# Patient Record
Sex: FEMALE | Race: WHITE | Employment: OTHER | URBAN - METROPOLITAN AREA
[De-identification: names, ages, dates, MRNs, and addresses within clinical notes are randomized per-mention and may not be internally consistent; named-entity substitution may affect disease eponyms.]

---

## 2017-12-06 ENCOUNTER — HOSPITAL ENCOUNTER (OUTPATIENT)
Dept: GENERAL RADIOLOGY | Age: 67
Discharge: HOME | End: 2017-12-06
Payer: MEDICARE

## 2017-12-06 DIAGNOSIS — M25.552 PAIN OF LEFT HIP JOINT: ICD-10-CM

## 2017-12-06 DIAGNOSIS — M25.551 PAIN OF RIGHT HIP JOINT: ICD-10-CM

## 2017-12-06 PROCEDURE — 73502 X-RAY EXAM HIP UNI 2-3 VIEWS: CPT

## 2017-12-12 ENCOUNTER — HOSPITAL ENCOUNTER (OUTPATIENT)
Dept: PHYSICAL THERAPY | Age: 67
Setting detail: THERAPIES SERIES
Discharge: HOME | End: 2017-12-12
Payer: MEDICARE

## 2017-12-12 PROCEDURE — 97530 THERAPEUTIC ACTIVITIES: CPT

## 2017-12-12 PROCEDURE — G8978 MOBILITY CURRENT STATUS: HCPCS

## 2017-12-12 PROCEDURE — 97110 THERAPEUTIC EXERCISES: CPT

## 2017-12-12 PROCEDURE — G8979 MOBILITY GOAL STATUS: HCPCS

## 2017-12-12 PROCEDURE — 97162 PT EVAL MOD COMPLEX 30 MIN: CPT

## 2017-12-12 ASSESSMENT — PAIN DESCRIPTION - PAIN TYPE: TYPE: CHRONIC PAIN

## 2017-12-12 ASSESSMENT — PAIN SCALES - GENERAL: PAINLEVEL_OUTOF10: 1

## 2017-12-15 ENCOUNTER — HOSPITAL ENCOUNTER (OUTPATIENT)
Dept: PHYSICAL THERAPY | Age: 67
Setting detail: THERAPIES SERIES
Discharge: HOME | End: 2017-12-15
Payer: MEDICARE

## 2017-12-15 PROCEDURE — 97140 MANUAL THERAPY 1/> REGIONS: CPT

## 2017-12-15 PROCEDURE — 97110 THERAPEUTIC EXERCISES: CPT

## 2017-12-15 ASSESSMENT — PAIN SCALES - GENERAL: PAINLEVEL_OUTOF10: 1

## 2017-12-15 ASSESSMENT — PAIN DESCRIPTION - PAIN TYPE: TYPE: CHRONIC PAIN

## 2017-12-15 NOTE — PROGRESS NOTES
Physical Therapy  Daily Treatment Note  Date: 12/15/2017  Patient Name: Ondina Mcclain  MRN: [id number]     :   1950    Subjective:   General  Chart Reviewed: Yes  Additional Pertinent Hx: herniated lumbar disc L5-S1  microdiscectomy, fibromyalgia  PT Visit Information  PT Insurance Information: Medicare  Total # of Visits Approved: 8 (2x per week for 4 weeks= 8 weeks)  Total # of Visits to Date: 2  No Show: 0  Canceled Appointment: 0  Subjective  Subjective: Pt states her tightness in her hip is improving. General Comment  Comments:  MRI scheduled for hip   Pain Screening  Patient Currently in Pain: Yes  Pain Assessment  Pain Assessment: 0-10  Pain Level: 1  Pain Type: Chronic pain  Vital Signs  Patient Currently in Pain: Yes       Treatment Activities:   Manual therapy  Soft Tissue Mobalization: L hip distraction with gentle oscillations to decrease pain. Ambulation  Ambulation?: Yes  Ambulation 1  Surface: level tile;carpet  Device: No Device  Assistance: Independent  Quality of Gait: pt displays inc step length after stretches but had pinching sensation in L posterior hip after 80'  Distance: 120'                    Exercises  Exercise 1: supine hip flexor stretch with L LE hanging over edge of bed H20-30 sec x 3  Exercise 2: supine hip abd x 3 H 30 sec (L only)  Exercise 3: SKTC H 30 sec x 3 reps  (R and L)  Exercise 4: hooklying march w/ abd brace; H3 x 10 bilaterally  Exercise 5: butterfly stretch; H30 x 3   Exercise 6: bridge w/ adduction; no hold x 10   Exercise 7: hip abd in hooklying; BTB H5 x 10   Exercise 8: R sidelying clamshell; H5 x 10 GTB   Exercise 9: seated L piriformis stretch; H 30 x 2  Exercise 20: recumbent bike x 5 min        Manual therapy  Soft Tissue Mobalization: L hip distraction with gentle oscillations to decrease pain.                            Assessment:   Conditions Requiring Skilled Therapeutic Intervention  Body structures, Functions, Activity

## 2017-12-17 ENCOUNTER — HOSPITAL ENCOUNTER (OUTPATIENT)
Dept: MRI IMAGING | Age: 67
Discharge: HOME | End: 2017-12-17
Payer: MEDICARE

## 2017-12-17 DIAGNOSIS — M16.9 LOCALIZED OSTEOARTHRITIS OF HIP: ICD-10-CM

## 2017-12-17 PROCEDURE — 72148 MRI LUMBAR SPINE W/O DYE: CPT

## 2017-12-18 ENCOUNTER — HOSPITAL ENCOUNTER (OUTPATIENT)
Dept: PHYSICAL THERAPY | Age: 67
Setting detail: THERAPIES SERIES
Discharge: HOME | End: 2017-12-18
Payer: MEDICARE

## 2017-12-18 PROCEDURE — 97140 MANUAL THERAPY 1/> REGIONS: CPT

## 2017-12-18 PROCEDURE — 97110 THERAPEUTIC EXERCISES: CPT

## 2017-12-18 ASSESSMENT — PAIN SCALES - GENERAL: PAINLEVEL_OUTOF10: 1

## 2017-12-18 ASSESSMENT — PAIN DESCRIPTION - PAIN TYPE: TYPE: CHRONIC PAIN

## 2017-12-22 ENCOUNTER — HOSPITAL ENCOUNTER (OUTPATIENT)
Dept: PHYSICAL THERAPY | Age: 67
Setting detail: THERAPIES SERIES
Discharge: HOME | End: 2017-12-22
Payer: MEDICARE

## 2017-12-22 PROCEDURE — 97110 THERAPEUTIC EXERCISES: CPT

## 2017-12-22 PROCEDURE — 97140 MANUAL THERAPY 1/> REGIONS: CPT

## 2017-12-22 ASSESSMENT — PAIN SCALES - GENERAL: PAINLEVEL_OUTOF10: 4

## 2017-12-22 ASSESSMENT — PAIN DESCRIPTION - ORIENTATION: ORIENTATION: LEFT

## 2017-12-22 ASSESSMENT — PAIN DESCRIPTION - LOCATION: LOCATION: HIP

## 2017-12-22 ASSESSMENT — PAIN DESCRIPTION - PAIN TYPE: TYPE: CHRONIC PAIN

## 2017-12-22 NOTE — PROGRESS NOTES
responded well to session w/her pain at a 1/10 after therapy. Pt continues to c/o \"hot spot\" in her back, pt declined a CP after session. Will ice at home. Continue and increase to tolerance.   Treatment Diagnosis: hip OA, difficulty walking  REQUIRES PT FOLLOW UP: Yes  Activity Tolerance  Activity Tolerance: Patient Tolerated treatment well      G-Code:     OutComes Score                                           Goals:  Short term goals  Time Frame for Short term goals: 2 wks  Short term goal 1: I with HEP  Short term goal 2: Decrease hip and back pain any amount during walking  Long term goals  Time Frame for Long term goals : 4 wks  Long term goal 1: Improve AROM L hip WFL without pain  Long term goal 2: Improve strength LE 4+/5 without pain during MMT to improve walking, stairclimbing  Long term goal 3: Improve LEFS less than 20%  Long term goal 4: Pt perform ADLs including community ambulation,  house work and work in yard with 2-3/10 pain or less greater than 75% of the day  Long term goal 5: Ambulate 300 feet or greater without device demonstrating near equal step length, improved upright posture with 3/10 pain or less  Patient Goals   Patient goals : Decrease pain and be able to go back to yard work, cutting wood and walking more with less pain    Plan:    Plan  Plan Comment: Cont per POC  Frequency and duration of tx  Days: 2  Weeks: 4     Therapy Time   Individual      Time In 1300         Time Out 1400         Minutes 60  therex 40  manual [address] [facility] [place]  License and [place] [address] Number: [id number]

## 2017-12-26 ENCOUNTER — HOSPITAL ENCOUNTER (OUTPATIENT)
Dept: PHYSICAL THERAPY | Age: 67
Setting detail: THERAPIES SERIES
Discharge: HOME | End: 2017-12-26
Payer: MEDICARE

## 2017-12-26 PROCEDURE — 97110 THERAPEUTIC EXERCISES: CPT

## 2017-12-26 ASSESSMENT — PAIN DESCRIPTION - PAIN TYPE: TYPE: CHRONIC PAIN

## 2017-12-26 ASSESSMENT — PAIN SCALES - GENERAL: PAINLEVEL_OUTOF10: 1

## 2017-12-26 ASSESSMENT — PAIN DESCRIPTION - ORIENTATION: ORIENTATION: LEFT

## 2017-12-26 ASSESSMENT — PAIN DESCRIPTION - LOCATION: LOCATION: HIP

## 2017-12-26 NOTE — PROGRESS NOTES
Physical Therapy  Daily Treatment Note  Date: 2017  Patient Name: Venkata Rodney  MRN: [id number]     :   1950    Subjective:   General  Chart Reviewed: Yes  Additional Pertinent Hx: herniated lumbar disc L5-S1  microdiscectomy, fibromyalgia  PT Visit Information  PT Insurance Information: Medicare  Total # of Visits Approved: 8 (2x per week for 4 weeks)  Total # of Visits to Date: 5  Plan of Care/Certification Expiration Date: 18  No Show: 0  Canceled Appointment: 0  Subjective  Subjective: Pt states that she is doing much better than she was before since she has been doing her stretching. \"That feeling in my hip, it feels like it's more of a catch there more than pain\". Pt states her muscles are tender. General Comment  Comments: Pt is to see Dr. Sue Foley on 1/10/18  Pain Screening  Patient Currently in Pain: Yes  Pain Assessment  Pain Assessment: 0-10  Pain Level: 1  Pain Type: Chronic pain  Pain Location: Hip  Pain Orientation: Left  Vital Signs  Patient Currently in Pain: Yes       Treatment Activities:                                      Exercises  Exercise 1: supine hip flexor stretch with L LE hanging over edge of bed 60\"x2  Exercise 3: bilat SKTC 60\"x3   Exercise 4: hooklying march w/ abd brace; 1# H3 3x 10 bilaterally  Exercise 6: bridge w/ adduction; no hold 2 x 10   Exercise 7: hip abd in hooklying; Black TB H5 2 x 10   Exercise 11: B hip IR/ER  seated; 2 x 10 RTB RLE H3 2x 10, YTB LLE H3 2 x 10   Exercise 20: recumbent bike x 5 min                                   Assessment:   Conditions Requiring Skilled Therapeutic Intervention  Body structures, Functions, Activity limitations: Decreased functional mobility ; Decreased ROM; Decreased strength  Assessment: Pt tolerated all ther ex with no increase in back pain. Pt issued bridges with adduction, abd brace with hip abd, IR strengthening to B hips and marching with abd bracing to strengthen back and hips for HEP.  Pain level 1/10 after

## 2017-12-28 ENCOUNTER — HOSPITAL ENCOUNTER (OUTPATIENT)
Dept: PHYSICAL THERAPY | Age: 67
Setting detail: THERAPIES SERIES
Discharge: HOME | End: 2017-12-28
Payer: MEDICARE

## 2017-12-28 PROCEDURE — 97110 THERAPEUTIC EXERCISES: CPT

## 2017-12-28 PROCEDURE — 97140 MANUAL THERAPY 1/> REGIONS: CPT

## 2017-12-28 ASSESSMENT — PAIN DESCRIPTION - PAIN TYPE: TYPE: CHRONIC PAIN

## 2017-12-28 ASSESSMENT — PAIN DESCRIPTION - ORIENTATION: ORIENTATION: LEFT

## 2017-12-28 ASSESSMENT — PAIN DESCRIPTION - LOCATION: LOCATION: HIP

## 2017-12-28 ASSESSMENT — PAIN SCALES - GENERAL: PAINLEVEL_OUTOF10: 1

## 2017-12-28 NOTE — PROGRESS NOTES
Physical Therapy  Daily Treatment Note  Date: 2017  Patient Name: Sharon Argueta  MRN: [id number]     :   1950    Subjective:   General  Chart Reviewed: Yes  Additional Pertinent Hx: herniated lumbar disc L5-S1  microdiscectomy, fibromyalgia  PT Visit Information  PT Insurance Information: Medicare  Total # of Visits Approved: 8  Total # of Visits to Date: 6  Plan of Care/Certification Expiration Date: 18  No Show: 1  Canceled Appointment: 0  Subjective  Subjective: Pt failed to show for therapy this date. Treatment Activities:                                                                          Assessment:   Conditions Requiring Skilled Therapeutic Intervention  Body structures, Functions, Activity limitations: Decreased functional mobility ; Decreased ROM; Decreased strength  Treatment Diagnosis: hip OA, difficulty walking  REQUIRES PT FOLLOW UP: Yes  Activity Tolerance  Activity Tolerance: Patient Tolerated treatment well      G-Code:     OutComes Score                                           Goals:  Short term goals  Time Frame for Short term goals: 2 wks  Short term goal 1: I with HEP  Short term goal 2: Decrease hip and back pain any amount during walking  Long term goals  Time Frame for Long term goals : 4 wks  Long term goal 1: Improve AROM L hip WFL without pain  Long term goal 2: Improve strength LE 4+/5 without pain during MMT to improve walking, stairclimbing  Long term goal 3: Improve LEFS less than 20%  Long term goal 4: Pt perform ADLs including community ambulation,  house work and work in yard with 2-3/10 pain or less greater than 75% of the day  Long term goal 5: Ambulate 300 feet or greater without device demonstrating near equal step length, improved upright posture with 3/10 pain or less  Patient Goals   Patient goals : Decrease pain and be able to go back to yard work, cutting wood and walking more with less pain    Plan:       Frequency and duration of tx  Days: 2  Weeks: 4     Therapy Time   Individual Concurrent Group Co-treatment   Time In  1000         Time Out  1010         Minutes  5 min   No show                 Katja Long PTA  License and [address] Number: [id number]

## 2018-01-02 ENCOUNTER — HOSPITAL ENCOUNTER (OUTPATIENT)
Dept: PHYSICAL THERAPY | Age: 68
Setting detail: THERAPIES SERIES
Discharge: HOME | End: 2018-01-02
Payer: MEDICARE

## 2018-01-02 PROCEDURE — 97140 MANUAL THERAPY 1/> REGIONS: CPT

## 2018-01-02 PROCEDURE — 97110 THERAPEUTIC EXERCISES: CPT

## 2018-01-02 ASSESSMENT — PAIN DESCRIPTION - PAIN TYPE: TYPE: CHRONIC PAIN

## 2018-01-02 ASSESSMENT — PAIN DESCRIPTION - ORIENTATION: ORIENTATION: LEFT

## 2018-01-02 ASSESSMENT — PAIN DESCRIPTION - LOCATION: LOCATION: HIP

## 2018-01-02 ASSESSMENT — PAIN SCALES - GENERAL: PAINLEVEL_OUTOF10: 1

## 2018-01-04 ENCOUNTER — HOSPITAL ENCOUNTER (OUTPATIENT)
Dept: PHYSICAL THERAPY | Age: 68
Setting detail: THERAPIES SERIES
Discharge: HOME | End: 2018-01-04
Payer: MEDICARE

## 2018-01-04 PROCEDURE — 97116 GAIT TRAINING THERAPY: CPT

## 2018-01-04 PROCEDURE — 97140 MANUAL THERAPY 1/> REGIONS: CPT

## 2018-01-04 PROCEDURE — 97110 THERAPEUTIC EXERCISES: CPT

## 2018-01-04 ASSESSMENT — PAIN DESCRIPTION - DESCRIPTORS: DESCRIPTORS: ACHING

## 2018-01-04 ASSESSMENT — PAIN DESCRIPTION - PAIN TYPE: TYPE: CHRONIC PAIN

## 2018-01-04 ASSESSMENT — PAIN SCALES - GENERAL: PAINLEVEL_OUTOF10: 1

## 2018-01-04 ASSESSMENT — PAIN DESCRIPTION - ORIENTATION: ORIENTATION: LEFT

## 2018-01-04 ASSESSMENT — PAIN DESCRIPTION - LOCATION: LOCATION: BACK;HIP

## 2018-01-04 NOTE — PROGRESS NOTES
Physical Therapy  Daily Treatment Note  Date: 2018  Patient Name: Smiley Crigler  MRN: [id number]     :   1950    Treatment Diagnosis: hip OA, difficulty walking  Subjective:   General  Additional Pertinent Hx: herniated lumbar disc L5-S1  microdiscectomy, fibromyalgia  PT Visit Information  PT Insurance Information: Medicare  Total # of Visits Approved: 8  Total # of Visits to Date: 8  Plan of Care/Certification Expiration Date: 18  No Show: 1  Canceled Appointment: 0  Subjective  Subjective: \"If it wasn't for that hot spot I'd be alright. \"   General Comment  Comments: Pt is to see Dr. Luis Marks on 1/10/18 and states has upcoming Cataract surgery on the  and    Pain Screening  Patient Currently in Pain: Yes  Pain Assessment  Pain Assessment: 0-10  Pain Level: 1  Pain Type: Chronic pain  Pain Location: Back; Hip  Pain Orientation: Left (PSIS )  Pain Descriptors: Aching  Vital Signs  Patient Currently in Pain: Yes       Treatment Activities:   Manual therapy  Soft Tissue Mobalization: L hip distraction with gentle oscillations and abduction stretch to decrease pain. MFR [place] to decrease pain, pt. could only tolerate few minutes of MFR until more tender over L SI               Ambulation  Ambulation?: Yes  Ambulation 1  Surface: level tile;carpet  Device: No Device  Assistance: Independent  Quality of Gait: Pt. demos mod antalgic gait even p stretching this date with decreased step length RLE and slight flexion at B hips. Distance: 200'  Comments: Pt. declines using cane for pain releif   Stairs/Curb  Stairs?: No                Exercises  Exercise 1: supine hip flexor stretch with B  LE hanging over edge of bed 60\"x2  Exercise 5: butterfly stretch 60\"x 3  Exercise 6: bridge w/ adduction; H5  x 10   Exercise 9: Bridge with Abd bracing x10 hold 5 sec   Exercise 13: B prifiromis stretch (figure  4);  H 60 x 2       Modalities  Cryotherapy (Minutes\Location): x10 min post to back to

## 2018-01-08 ENCOUNTER — HOSPITAL ENCOUNTER (OUTPATIENT)
Dept: PHYSICAL THERAPY | Age: 68
Setting detail: THERAPIES SERIES
Discharge: HOME | End: 2018-01-08
Payer: MEDICARE

## 2018-01-08 PROCEDURE — G8980 MOBILITY D/C STATUS: HCPCS

## 2018-01-08 PROCEDURE — G8979 MOBILITY GOAL STATUS: HCPCS

## 2018-01-08 PROCEDURE — 97110 THERAPEUTIC EXERCISES: CPT

## 2018-01-08 PROCEDURE — 97530 THERAPEUTIC ACTIVITIES: CPT

## 2018-01-08 ASSESSMENT — PAIN DESCRIPTION - DESCRIPTORS: DESCRIPTORS: ACHING

## 2018-01-08 ASSESSMENT — PAIN DESCRIPTION - PAIN TYPE: TYPE: CHRONIC PAIN

## 2018-01-08 ASSESSMENT — PAIN DESCRIPTION - LOCATION: LOCATION: BACK;HIP

## 2018-01-08 ASSESSMENT — PAIN DESCRIPTION - ORIENTATION: ORIENTATION: LEFT

## 2018-01-08 ASSESSMENT — PAIN SCALES - GENERAL: PAINLEVEL_OUTOF10: 1

## 2018-01-08 NOTE — PROGRESS NOTES
Physical Therapy  Monthly Recheck/ Discharge Summary  Date: 2018  Patient Name: Karen Luevano  MRN: [id number]     :   1950    Subjective:   General  Chart Reviewed: Yes  Additional Pertinent Hx: herniated lumbar disc L5-S1  microdiscectomy, fibromyalgia  PT Visit Information  PT Insurance Information: Medicare  Total # of Visits to Date: 9  No Show: 1  Canceled Appointment: 0  Subjective  Subjective: Pt reports still having hot spot L lower lumbar spine constant , less hip pain able to stand up straighter. MRI completed  and waiting to hear results of MRI. (Appt with Dr. Nancy Paz.)  General Comment  Comments: Pt is to see Dr. Lio Flores on 1/10/18 and states has upcoming Cataract surgery on the  and    Pain Screening  Patient Currently in Pain: Yes  Pain Assessment  Pain Assessment: 0-10  Pain Level: 1  Pain Type: Chronic pain  Pain Location: Back; Hip  Pain Orientation: Left  Pain Descriptors: Aching  Vital Signs  Patient Currently in Pain: Yes       Treatment Activities:    Ambulation  Ambulation?: Yes  Ambulation 1  Surface: level tile;carpet  Device: No Device  Assistance: Independent  Quality of Gait: mild antalgic gait pattern slighly flexed at spine and hips however improved upright posture during gait with slightly shorter R step length compared to L    Distance: 300 ft  Comments: Pt refuses to use cane at this time  Stairs/Curb  Stairs?: No      AROM LLE (degrees)  LLE General AROM: 115 deg flex, 25 deg abd, ER WFL, IR limited beyond neutral due to groin pain  Strength LLE  Comment: L hip flex 4/5, abd 4/5, ext 4- to 4/5 , knee flex and ext 4+/5 ankle df 4+/5    Exercises  Exercise 1: supine hip flexor stretch with L LE hanging over edge of bed 30 45\"x2  Exercise 3: SKTC x 20-30 sec x 1-2 with vcs for technique (tendency to flex hip into ER at end range)  Exercise 5: butterfly stretch 30\"x 1-2  Exercise 9: Bridge with Abd bracing x5 hold 5 sec   Exercise 13:  B

## 2022-03-09 ENCOUNTER — APPOINTMENT (OUTPATIENT)
Dept: GENERAL RADIOLOGY | Age: 72
End: 2022-03-09
Payer: MEDICARE

## 2022-03-09 ENCOUNTER — HOSPITAL ENCOUNTER (EMERGENCY)
Age: 72
Discharge: HOME | End: 2022-03-09
Attending: EMERGENCY MEDICINE
Payer: MEDICARE

## 2022-03-09 VITALS
OXYGEN SATURATION: 96 % | RESPIRATION RATE: 18 BRPM | HEART RATE: 84 BPM | DIASTOLIC BLOOD PRESSURE: 97 MMHG | BODY MASS INDEX: 27.09 KG/M2 | HEIGHT: 72 IN | WEIGHT: 200 LBS | TEMPERATURE: 97.7 F | SYSTOLIC BLOOD PRESSURE: 166 MMHG

## 2022-03-09 DIAGNOSIS — S61.112A LACERATION OF LEFT THUMB WITHOUT FOREIGN BODY WITH DAMAGE TO NAIL, INITIAL ENCOUNTER: ICD-10-CM

## 2022-03-09 DIAGNOSIS — S62.522B OPEN DISPLACED FRACTURE OF DISTAL PHALANX OF LEFT THUMB, INITIAL ENCOUNTER: Primary | ICD-10-CM

## 2022-03-09 PROCEDURE — 2580000003 HC RX 258: Performed by: EMERGENCY MEDICINE

## 2022-03-09 PROCEDURE — 99284 EMERGENCY DEPT VISIT MOD MDM: CPT

## 2022-03-09 PROCEDURE — 12002 RPR S/N/AX/GEN/TRNK2.6-7.5CM: CPT

## 2022-03-09 PROCEDURE — A4217 STERILE WATER/SALINE, 500 ML: HCPCS | Performed by: EMERGENCY MEDICINE

## 2022-03-09 PROCEDURE — 90715 TDAP VACCINE 7 YRS/> IM: CPT | Performed by: EMERGENCY MEDICINE

## 2022-03-09 PROCEDURE — 6370000000 HC RX 637 (ALT 250 FOR IP): Performed by: EMERGENCY MEDICINE

## 2022-03-09 PROCEDURE — 73140 X-RAY EXAM OF FINGER(S): CPT

## 2022-03-09 PROCEDURE — 2500000003 HC RX 250 WO HCPCS: Performed by: EMERGENCY MEDICINE

## 2022-03-09 PROCEDURE — 90471 IMMUNIZATION ADMIN: CPT | Performed by: EMERGENCY MEDICINE

## 2022-03-09 PROCEDURE — 6360000002 HC RX W HCPCS: Performed by: EMERGENCY MEDICINE

## 2022-03-09 RX ORDER — MAGNESIUM HYDROXIDE 1200 MG/15ML
250 LIQUID ORAL CONTINUOUS
Status: DISCONTINUED | OUTPATIENT
Start: 2022-03-09 | End: 2022-03-09 | Stop reason: HOSPADM

## 2022-03-09 RX ORDER — HYDROCODONE BITARTRATE AND ACETAMINOPHEN 5; 325 MG/1; MG/1
1 TABLET ORAL EVERY 6 HOURS PRN
Qty: 10 TABLET | Refills: 0 | Status: SHIPPED | OUTPATIENT
Start: 2022-03-09 | End: 2022-03-12

## 2022-03-09 RX ORDER — CEPHALEXIN 500 MG/1
500 CAPSULE ORAL ONCE
Status: COMPLETED | OUTPATIENT
Start: 2022-03-09 | End: 2022-03-09

## 2022-03-09 RX ORDER — CEPHALEXIN 500 MG/1
500 CAPSULE ORAL 4 TIMES DAILY
Qty: 28 CAPSULE | Refills: 0 | Status: SHIPPED | OUTPATIENT
Start: 2022-03-09 | End: 2022-03-16

## 2022-03-09 RX ORDER — HYDROCODONE BITARTRATE AND ACETAMINOPHEN 5; 325 MG/1; MG/1
1 TABLET ORAL ONCE
Status: COMPLETED | OUTPATIENT
Start: 2022-03-09 | End: 2022-03-09

## 2022-03-09 RX ORDER — LIDOCAINE HYDROCHLORIDE 10 MG/ML
5 INJECTION, SOLUTION INFILTRATION; PERINEURAL ONCE
Status: COMPLETED | OUTPATIENT
Start: 2022-03-09 | End: 2022-03-09

## 2022-03-09 RX ADMIN — SODIUM CHLORIDE 250 ML: 900 IRRIGANT IRRIGATION at 15:32

## 2022-03-09 RX ADMIN — LIDOCAINE HYDROCHLORIDE 5 ML: 10 INJECTION, SOLUTION INFILTRATION; PERINEURAL at 15:28

## 2022-03-09 RX ADMIN — TETANUS TOXOID, REDUCED DIPHTHERIA TOXOID AND ACELLULAR PERTUSSIS VACCINE, ADSORBED 0.5 ML: 5; 2.5; 8; 8; 2.5 SUSPENSION INTRAMUSCULAR at 15:33

## 2022-03-09 RX ADMIN — HYDROCODONE BITARTRATE AND ACETAMINOPHEN 1 TABLET: 5; 325 TABLET ORAL at 15:30

## 2022-03-09 RX ADMIN — CEPHALEXIN 500 MG: 500 CAPSULE ORAL at 15:31

## 2022-03-09 ASSESSMENT — ENCOUNTER SYMPTOMS
DIARRHEA: 0
BACK PAIN: 0
CHEST TIGHTNESS: 0
CONSTIPATION: 0
FACIAL SWELLING: 0
SHORTNESS OF BREATH: 0
ABDOMINAL PAIN: 0
EYE REDNESS: 0
WHEEZING: 0
EYE DISCHARGE: 0
SORE THROAT: 0
VOMITING: 0
VOICE CHANGE: 0
EYE PAIN: 0
STRIDOR: 0
COUGH: 0
SINUS PRESSURE: 0
CHOKING: 0
BLOOD IN STOOL: 0
TROUBLE SWALLOWING: 0

## 2022-03-09 ASSESSMENT — PAIN DESCRIPTION - FREQUENCY: FREQUENCY: CONTINUOUS

## 2022-03-09 ASSESSMENT — PAIN SCALES - GENERAL
PAINLEVEL_OUTOF10: 4

## 2022-03-09 ASSESSMENT — PAIN DESCRIPTION - ORIENTATION: ORIENTATION: LEFT

## 2022-03-09 ASSESSMENT — PAIN DESCRIPTION - LOCATION: LOCATION: FINGER (COMMENT WHICH ONE)

## 2022-03-09 ASSESSMENT — PAIN DESCRIPTION - DESCRIPTORS: DESCRIPTORS: ACHING

## 2022-03-09 NOTE — ED NOTES
Suturing completed by . . Pt advised of plan of care and care for wound. Left hand and arm cleaned of blood after pressure applied and bleeding under control. Wound then covered with Xeroform dressing, telfa dressing and bulky, tube gauze dressing. Pt solomon well. Pt advised of appointment for 8am tomorrow by Alden Litten with specialist.  Pt to keep dressing in place until seen by Ortho. Pt verbalized understanding of care.      Brandyn Lucas RN  03/09/22 [id number]

## 2022-03-09 NOTE — ED PROVIDER NOTES
[facility] ED  eMERGENCY dEPARTMENT eNCOUnter      Pt Name: Vandana Romero  MRN: [id number]  [place] 1950  Date of evaluation: 3/9/2022  Provider: Arnaud Choi MD    [address] [place] [address]       Chief Complaint   Patient presents with    Laceration     Laceration to left thumb. Cut with table saw -1hr ago. HISTORY OF PRESENT ILLNESS   (Location/Symptom, Timing/Onset,Context/Setting, Quality, Duration, Modifying Factors, Severity)  Note limiting factors. Vandana Romero is a 67 y.o. female who presents to the emergency department to this emergency because of the  Left  thumb injury happened 1 hour ago while accidentally cutting on a table saw bleeding controlled here for further evaluation and treatment no other injury    HPI    NursingNotes were reviewed. REVIEW OF SYSTEMS    (2-9 systems for level 4, 10 or more for level 5)     Review of Systems   Constitutional: Negative. Negative for activity change and fever. HENT: Negative for congestion, drooling, facial swelling, mouth sores, nosebleeds, sinus pressure, sore throat, trouble swallowing and voice change. Eyes: Negative for pain, discharge, redness and visual disturbance. Respiratory: Negative for cough, choking, chest tightness, shortness of breath, wheezing and stridor. Cardiovascular: Negative for chest pain, palpitations and leg swelling. Gastrointestinal: Negative for abdominal pain, blood in stool, constipation, diarrhea and vomiting. Endocrine: Negative for cold intolerance, polyphagia and polyuria. Genitourinary: Negative for dysuria, flank pain, frequency, genital sores and urgency. Musculoskeletal: Negative for back pain, joint swelling, neck pain and neck stiffness. Skin: Positive for wound. Negative for pallor and rash. Neurological: Negative for tremors, seizures, syncope, weakness, numbness and headaches. Hematological: Negative for adenopathy. Does not bruise/bleed easily.    Psychiatric/Behavioral: Negative for agitation, behavioral problems, hallucinations and sleep disturbance. The patient is not hyperactive. All other systems reviewed and are negative. Except as noted above the remainder of the review of systems was reviewed and negative. PAST MEDICAL HISTORY   History reviewed. No pertinent past medical history. SURGICALHISTORY     History reviewed. No pertinent surgical history. CURRENT MEDICATIONS       Previous Medications    No medications on file       ALLERGIES     Patient has no known allergies. FAMILY HISTORY     History reviewed. No pertinent family history. SOCIAL HISTORY       Social History     Socioeconomic History    Marital status: Single     Spouse name: None    Number of children: None    Years of education: None    Highest education level: None   Occupational History    None   Tobacco Use    Smoking status: Current Every Day Smoker     Packs/day: 0.50     Types: Cigarettes    Smokeless tobacco: Never Used   Substance and Sexual Activity    Alcohol use: Not Currently    Drug use: Never    Sexual activity: Not Currently     Partners: Male   Other Topics Concern    None   Social History Narrative    None     Social Determinants of Health     Financial Resource Strain:     Difficulty of Paying Living Expenses: Not on file   Food Insecurity:     Worried About Running Out of Food in the Last Year: Not on file    Ricco of Food in the Last Year: Not on file   Transportation Needs:     Lack of Transportation (Medical): Not on file    Lack of Transportation (Non-Medical):  Not on file   Physical Activity:     Days of Exercise per Week: Not on file    Minutes of Exercise per Session: Not on file   Stress:     Feeling of Stress : Not on file   Social Connections:     Frequency of Communication with Friends and Family: Not on file    Frequency of Social Gatherings with Friends and Family: Not on file    Attends Druze Services: Not on file  Active Member of Clubs or Organizations: Not on file    Attends Club or Organization Meetings: Not on file    Marital Status: Not on file   Intimate Partner Violence:     Fear of Current or Ex-Partner: Not on file    Emotionally Abused: Not on file    Physically Abused: Not on file    Sexually Abused: Not on file   Housing Stability:     Unable to Pay for Housing in the Last Year: Not on file    Number of [place] in the Last Year: Not on file    Unstable Housing in the Last Year: Not on file       SCREENINGS    [place] Coma Scale  Eye Opening: Spontaneous  Best Verbal Response: Oriented  Best Motor Response: Obeys commands  [place] Coma Scale Score: 15 @FLOW([id number])@      PHYSICAL EXAM    (up to 7 for level 4, 8 or more for level 5)     ED Triage Vitals [03/09/22 1454]   BP Temp Temp Source Pulse Resp SpO2 Height Weight   (!) 166/97 97.7 °F (36.5 °C) Oral 84 18 96 % 6' (1.829 m) 200 lb (90.7 kg)       Physical Exam  Vitals and nursing note reviewed. Constitutional:       General: She is not in acute distress. Appearance: She is well-developed. She is not ill-appearing, toxic-appearing or diaphoretic. Comments: Alert cooperative patient nontoxic   HENT:      Head: Normocephalic and atraumatic. Right Ear: Tympanic membrane, ear canal and external ear normal.      Left Ear: Tympanic membrane, ear canal and external ear normal. There is no impacted cerumen. Nose: No congestion or rhinorrhea. Mouth/Throat:      Pharynx: No oropharyngeal exudate or posterior oropharyngeal erythema. Eyes:      General:         Left eye: No discharge. Neck:      Vascular: No carotid bruit. Cardiovascular:      Rate and Rhythm: Normal rate and regular rhythm. Heart sounds: Normal heart sounds. No murmur heard. No gallop. Pulmonary:      Effort: No respiratory distress. Breath sounds: Normal breath sounds. No wheezing.    Abdominal:      General: Bowel sounds are normal. There is no distension. Palpations: Abdomen is soft. There is no mass. Tenderness: There is no abdominal tenderness. There is no right CVA tenderness, guarding or rebound. Hernia: No hernia is present. Musculoskeletal:         General: Swelling, tenderness, deformity and signs of injury present. Normal range of motion. Cervical back: Neck supple. Tenderness present. No rigidity. Comments: Attention come to the left hand patient has crush injury iszagged  laceration distal volar aspect no complete amputation minimal bleeding still going on at this time pressure applied to control the bleeding with the visible bone no foreign body seen   Skin:     General: Skin is warm. Capillary Refill: Capillary refill takes less than 2 seconds. Coloration: Skin is not jaundiced. Findings: No bruising, erythema, lesion or rash. Neurological:      Mental Status: She is alert and oriented to person, place, and time. Cranial Nerves: No cranial nerve deficit. Sensory: No sensory deficit. Motor: No weakness or abnormal muscle tone. Gait: Gait normal.      Deep Tendon Reflexes: Reflexes normal.   Psychiatric:         Behavior: Behavior normal.         Thought Content: Thought content normal.         DIAGNOSTIC RESULTS     EKG: All EKG's are interpreted by the Emergency Department Physician who either signs or Co-signsthis chart in the absence of a cardiologist.        RADIOLOGY:   Eddy Manzanilla such as CT, Ultrasound and MRI are read by the radiologist. Plain radiographic images are visualized and preliminarily interpreted by the emergency physician with the below findings:        Interpretation per the Radiologist below, if available at the time ofthis note:    XR FINGER LEFT (MIN 2 VIEWS)   Final Result   Fragmentation/comminuted fracture involving the distal phalange of the first digit.             ED BEDSIDE ULTRASOUND:   Performed by ED Physician - none    LABS:  Labs Reviewed - No data to display    All other labs were within normal range or not returned as of this dictation. EMERGENCY DEPARTMENT COURSE and DIFFERENTIAL DIAGNOSIS/MDM:   Vitals:    Vitals:    03/09/22 1454 03/09/22 1457   BP: (!) 166/97    Pulse: 84    Resp: 18    Temp: 97.7 °F (36.5 °C)    TempSrc: Oral    SpO2: 96%    Weight: 200 lb (90.7 kg) 200 lb (90.7 kg)   Height: 6' (1.829 m) 6' (1.829 m)           MDM  Number of Diagnoses or Management Options  Laceration of left thumb without foreign body with damage to nail, initial encounter  Open displaced fracture of distal phalanx of left thumb, initial encounter  Diagnosis management comments: Patient is complex jagged laceration tablesaw injury underneath is a fracture open wound start with Keflex pain medication finger block and 5 stitches applied to close the gap control the bleeding Case discussed with Dr. Fabrice Starks orthopedic would like to see the patient tomorrow at 8:00 actually feels office patient and family is told about it is agrees with plan patient keep the splint and dressing keep it elevated      CRITICAL CARE TIME   Total Critical Care time was minutes, excluding separately reportableprocedures. There was a high probability of clinicallysignificant/life threatening deterioration in the patient's condition which required my urgent intervention. CONSULTS:  None    PROCEDURES:  Unless otherwise noted below, none     Lac Repair    Date/Time: 3/9/2022 3:59 PM  Performed by: Mitchel Coto MD  Authorized by: Mitchel Coto MD     Consent:     Consent obtained:  Verbal and emergent situation    Consent given by:  Patient    Risks discussed:  Infection, pain, poor cosmetic result, poor wound healing, nerve damage and need for additional repair  Anesthesia (see MAR for exact dosages):      Anesthesia method:  Local infiltration    Local anesthetic:  Lidocaine 1% w/o epi  Laceration details:     Location:  Finger    Finger location:  L thumb    Length (cm): 3    Depth (mm):  0.2  Repair type:     Repair type:  Complex  Pre-procedure details:     Preparation:  Patient was prepped and draped in usual sterile fashion  Exploration:     Hemostasis achieved with:  Direct pressure    Wound extent: muscle damage, nerve damage and underlying fracture      Contaminated: no    Treatment:     Area cleansed with:  Betadine, Hibiclens and saline    Amount of cleaning:  Extensive    Irrigation solution:  Sterile saline    Irrigation method:  Syringe    Debridement:  None  Skin repair:     Repair method:  Sutures    Suture size:  4-0    Suture material:  Prolene    Suture technique:  Simple interrupted    Number of sutures:  5  Approximation:     Approximation:  Loose  Post-procedure details:     Dressing:  Non-adherent dressing, bulky dressing and splint for protection    Patient tolerance of procedure: Tolerated well, no immediate complications  Comments:      Patient with open fracture patient is told about finding antibiotic started Case discussed with Ortho for close follow-up        FINAL IMPRESSION      1. Open displaced fracture of distal phalanx of left thumb, initial encounter    2. Laceration of left thumb without foreign body with damage to nail, initial encounter          DISPOSITION/PLAN   DISPOSITION        PATIENT REFERRED TO:  Yunior Alvarenga MD  [id number]  [facility] [phone]    In 1 day        DISCHARGE MEDICATIONS:  New Prescriptions    CEPHALEXIN (KEFLEX) 500 MG CAPSULE    Take 1 capsule by mouth 4 times daily for 7 days    HYDROCODONE-ACETAMINOPHEN (NORCO) 5-325 MG PER TABLET    Take 1 tablet by mouth every 6 hours as needed for Pain for up to 3 days.           (Please note that portions of this note were completed with a voice recognition program.  Efforts were made to edit the dictations but occasionally words are mis-transcribed.)    Augustin Martinez MD (electronically signed)  Attending Emergency Physician       Augustin Martinez MD  03/09/22 [id number]

## 2022-03-09 NOTE — ED TRIAGE NOTES
Patient presents to ED with c/o left thumb laceration that occurred earlier today while using a table saw.